# Patient Record
Sex: FEMALE | Race: WHITE | HISPANIC OR LATINO | Employment: FULL TIME | URBAN - METROPOLITAN AREA
[De-identification: names, ages, dates, MRNs, and addresses within clinical notes are randomized per-mention and may not be internally consistent; named-entity substitution may affect disease eponyms.]

---

## 2020-12-28 ENCOUNTER — NON-PROVIDER VISIT (OUTPATIENT)
Dept: OCCUPATIONAL MEDICINE | Facility: CLINIC | Age: 25
End: 2020-12-28

## 2020-12-28 ENCOUNTER — EH NON-PROVIDER (OUTPATIENT)
Dept: OCCUPATIONAL MEDICINE | Facility: CLINIC | Age: 25
End: 2020-12-28

## 2020-12-28 DIAGNOSIS — Z02.89 ENCOUNTER FOR OCCUPATIONAL HEALTH EXAMINATION INVOLVING RESPIRATOR: ICD-10-CM

## 2020-12-28 DIAGNOSIS — Z23 NEED FOR VACCINATION: ICD-10-CM

## 2020-12-28 PROCEDURE — 94375 RESPIRATORY FLOW VOLUME LOOP: CPT | Performed by: PREVENTIVE MEDICINE

## 2021-05-09 ENCOUNTER — OFFICE VISIT (OUTPATIENT)
Dept: URGENT CARE | Facility: CLINIC | Age: 26
End: 2021-05-09
Payer: COMMERCIAL

## 2021-05-09 ENCOUNTER — APPOINTMENT (OUTPATIENT)
Dept: RADIOLOGY | Facility: IMAGING CENTER | Age: 26
End: 2021-05-09
Attending: FAMILY MEDICINE
Payer: COMMERCIAL

## 2021-05-09 VITALS
RESPIRATION RATE: 16 BRPM | HEART RATE: 78 BPM | TEMPERATURE: 97.9 F | SYSTOLIC BLOOD PRESSURE: 104 MMHG | OXYGEN SATURATION: 98 % | DIASTOLIC BLOOD PRESSURE: 58 MMHG

## 2021-05-09 DIAGNOSIS — S63.636A SPRAIN OF INTERPHALANGEAL JOINT OF RIGHT LITTLE FINGER, INITIAL ENCOUNTER: ICD-10-CM

## 2021-05-09 PROCEDURE — 99203 OFFICE O/P NEW LOW 30 MIN: CPT | Performed by: FAMILY MEDICINE

## 2021-05-09 PROCEDURE — 73140 X-RAY EXAM OF FINGER(S): CPT | Mod: TC,RT | Performed by: FAMILY MEDICINE

## 2021-05-09 RX ORDER — NORGESTIMATE AND ETHINYL ESTRADIOL 7DAYSX3 28
KIT ORAL
COMMUNITY
Start: 2021-04-19

## 2021-05-09 RX ORDER — DUPILUMAB 300 MG/2ML
INJECTION, SOLUTION SUBCUTANEOUS
COMMUNITY
Start: 2021-04-24 | End: 2024-03-01

## 2021-05-09 NOTE — PROGRESS NOTES
Subjective:      Saroj King is a 25 y.o. female who presents with Finger Injury ([id number], right pinky finger)    - This is a pleasant and nontoxic appearing 25 y.o. female with c/o playing rugby yesterday and hurt Rt pinky finger. Thinks maybe it got twisted in a jersey       ALLERGIES:  Patient has no known allergies.     PMH:  History reviewed. No pertinent past medical history.     PSH:  History reviewed. No pertinent surgical history.    MEDS:    Current Outpatient Medications:   •  DUPIXENT 300 MG/2ML Solution Prefilled Syringe injection, , Disp: , Rfl:   •  TRELEGY ELLIPTA 100-62.5-25 MCG/INH AEROSOL POWDER, BREATH ACTIVATED, INHALE 1 PUFF BY MOUTH AT THE SAME TIME EVERY DAY, Disp: , Rfl:   •  TRI-SPRINTEC 0.18/0.215/0.25 MG-35 MCG Tab, TAKE 1 TABLET BY MOUTH ONE TIME DAILY. NO FURTHER REFILLS UNTIL APPOINTMENT IS MADE, Disp: , Rfl:   •  Cetirizine HCl (ZYRTEC PO), Take  by mouth., Disp: , Rfl:   •  ALBUTEROL SULFATE PO, Take  by mouth., Disp: , Rfl:   •  Fluticasone-Salmeterol (ADVAIR HFA INH), Inhale  by mouth., Disp: , Rfl:   •  Etonogestrel (NEXPLANON SC), Inject  as instructed., Disp: , Rfl:     ** I have documented what I find to be significant in regards to past medical, social, family and surgical history  in my HPI or under PMH/PSH/FH review section, otherwise it is noncontributory **             HPI    Review of Systems   Musculoskeletal: Positive for joint pain.   All other systems reviewed and are negative.         Objective:     /58 (BP Location: Left arm, Patient Position: Sitting, BP Cuff Size: Adult)   Pulse 78   Temp 36.6 °C (97.9 °F) (Temporal)   Resp 16   SpO2 98%      Physical Exam  Vitals and nursing note reviewed.   Constitutional:       General: She is not in acute distress.     Appearance: Normal appearance. She is well-developed.   HENT:      Head: Normocephalic and atraumatic.   Eyes:      General: No scleral icterus.  Cardiovascular:      Heart sounds: Normal heart  sounds. No murmur.   Pulmonary:      Effort: Pulmonary effort is normal. No respiratory distress.   Musculoskeletal:        Hands:       Comments: Rt pinky: + edema, bruising and TTP. Good SROM   Skin:     Coloration: Skin is not jaundiced or pale.   Neurological:      Mental Status: She is alert.      Motor: No abnormal muscle tone.   Psychiatric:         Mood and Affect: Mood normal.         Behavior: Behavior normal.                 Assessment/Plan:          1. Sprain of interphalangeal joint of right little finger, initial encounter  DX-FINGER(S) 2+ RIGHT       Xray: no acute findings by MY READ. RADIOLOGY READ PENDING.     - Dx, plan & d/c instructions discussed w/ patient   - RICE, stay hydrated OTC Motrin 3 tabs every 8hrs x 5 days  - long finger splint  - E.R. precautions discussed     Follow up with their PCP in 7 days, ER if not improving or feeling/getting worse.    Any realistic side effects of medications that may have been given today reviewed.     Patient left in stable condition     Please note that this dictation was created using voice recognition software. I have made every reasonable attempt to correct obvious errors, but I expect that there are errors of grammar and possibly content that I did not discover before finalizing the note.

## 2021-05-13 ENCOUNTER — OFFICE VISIT (OUTPATIENT)
Dept: MEDICAL GROUP | Facility: MEDICAL CENTER | Age: 26
End: 2021-05-13
Payer: COMMERCIAL

## 2021-05-13 VITALS
BODY MASS INDEX: 32.02 KG/M2 | HEART RATE: 100 BPM | WEIGHT: 204 LBS | TEMPERATURE: 97.5 F | SYSTOLIC BLOOD PRESSURE: 108 MMHG | DIASTOLIC BLOOD PRESSURE: 60 MMHG | OXYGEN SATURATION: 96 % | HEIGHT: 67 IN

## 2021-05-13 DIAGNOSIS — Z76.89 ENCOUNTER TO ESTABLISH CARE: ICD-10-CM

## 2021-05-13 DIAGNOSIS — R45.86 MOOD CHANGES: ICD-10-CM

## 2021-05-13 DIAGNOSIS — J45.40 MODERATE PERSISTENT ASTHMA WITHOUT COMPLICATION: ICD-10-CM

## 2021-05-13 DIAGNOSIS — B07.9 VERRUCA VULGARIS: ICD-10-CM

## 2021-05-13 DIAGNOSIS — I83.813 VARICOSE VEINS OF BOTH LOWER EXTREMITIES WITH PAIN: ICD-10-CM

## 2021-05-13 PROCEDURE — 99203 OFFICE O/P NEW LOW 30 MIN: CPT | Performed by: NURSE PRACTITIONER

## 2021-05-13 RX ORDER — FLUOXETINE 10 MG/1
10 CAPSULE ORAL DAILY
Qty: 30 CAPSULE | Refills: 1 | Status: SHIPPED | OUTPATIENT
Start: 2021-05-13 | End: 2021-07-10

## 2021-05-13 NOTE — PROGRESS NOTES
"Chief Complaint   Patient presents with   • Medication Refill        Saroj King is a 25 y.o. 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     Asthma  Currently followed by an allergy specialist for this.  Currently taking Trelegy.  She was also placed on Dupixent for eczema however has been however asthma.  Has been getting allergy injections since January 2020.  She has an upcoming appointment in June for repeat spirometry.  She feels as if her asthma has been in better control since leaving her previous environments.  Severe allergy to cats.    Wart  Patient has a wart on her thumb that has been ongoing for quite some time.  Has tried multiple remedies and has not successfully been resolved.  Would like cryotherapy at some time.    Varicose vein  Patient also has varicose veins.  States she has had a since he was 13 years old.  Has been wearing compression stockings on and off for this.  They are quite prominent especially in her upper thighs as well as behind her knee.    Mood changes  At this time patient has brought this up at the end of the visit and our discussion this was not detailed.  Have expressed my concerns for immediately prescribing medication that could potentially throw her into a manic episode without having an accurate diagnosis.  Patient mentions that she has been following with counseling once a week.  States that she has been having \"highs and lows\" of her mood.  She is called out a few times as she is unable to go to work due to her mood changes.  She is currently not on any medication however feeling somewhat more depressive.   Mentions that they are \"working with a diagnosis of bipolar.\"  She would like to start medications at this time.        ROS:  Denies any Headache, Blurred Vision, Confusion, Chest pain,  Shortness of breath,  Abdominal pain, Changes of bowel or bladder, " Hematuria, Hematochezia, Lower ext. edema, Fevers, Nights sweats, Weight Changes, Focal weakness or numbness.  And all other systems reviewed and all are negative. Positive for : See above    Current Outpatient Medications:   •  FLUoxetine (PROZAC) 10 MG Cap, Take 1 capsule by mouth every day., Disp: 30 capsule, Rfl: 1  •  DUPIXENT 300 MG/2ML Solution Prefilled Syringe injection, , Disp: , Rfl:   •  TRELEGY ELLIPTA 100-62.5-25 MCG/INH AEROSOL POWDER, BREATH ACTIVATED, INHALE 1 PUFF BY MOUTH AT THE SAME TIME EVERY DAY, Disp: , Rfl:   •  TRI-SPRINTEC 0.18/0.215/0.25 MG-35 MCG Tab, TAKE 1 TABLET BY MOUTH ONE TIME DAILY. NO FURTHER REFILLS UNTIL APPOINTMENT IS MADE, Disp: , Rfl:   •  Cetirizine HCl (ZYRTEC PO), Take  by mouth., Disp: , Rfl:     No Known Allergies    Past Medical History:   Diagnosis Date   • Asthma      Past Surgical History:   Procedure Laterality Date   • TONSILLECTOMY AND ADENOIDECTOMY       Family History   Problem Relation Age of Onset   • Stroke Maternal Grandmother         hemorrhagic   • Hypertension Maternal Grandmother    • Lung Cancer Maternal Grandfather    • Cancer Maternal Grandfather    • Diabetes Neg Hx      Social History     Socioeconomic History   • Marital status: Unknown     Spouse name: Not on file   • Number of children: Not on file   • Years of education: Not on file   • Highest education level: Not on file   Occupational History   • Not on file   Tobacco Use   • Smoking status: Never Smoker   • Smokeless tobacco: Never Used   Vaping Use   • Vaping Use: Never used   Substance and Sexual Activity   • Alcohol use: Yes     Comment: Occ   • Drug use: Never   • Sexual activity: Not on file   Other Topics Concern   • Not on file   Social History Narrative   • Not on file     Social Determinants of Health     Financial Resource Strain:    • Difficulty of Paying Living Expenses:    Food Insecurity:    • Worried About Running Out of Food in the Last Year:    • Ran Out of Food in the Last  "Year:    Transportation Needs:    • Lack of Transportation (Medical):    • Lack of Transportation (Non-Medical):    Physical Activity:    • Days of Exercise per Week:    • Minutes of Exercise per Session:    Stress:    • Feeling of Stress :    Social Connections:    • Frequency of Communication with Friends and Family:    • Frequency of Social Gatherings with Friends and Family:    • Attends [employer] Services:    • Active Member of Clubs or Organizations:    • Attends Club or Organization Meetings:    • Marital Status:    Intimate Partner Violence:    • Fear of Current or Ex-Partner:    • Emotionally Abused:    • Physically Abused:    • Sexually Abused:        Objective:     Vitals: /60 (BP Location: Right arm, Patient Position: Sitting, BP Cuff Size: Adult)   Pulse 100   Temp 36.4 °C (97.5 °F) (Temporal)   Ht 1.689 m (5' 6.5\")   Wt 92.5 kg (204 lb)   LMP 2021   SpO2 96%   Breastfeeding No   BMI 32.43 kg/m²      General: Alert, pleasant, NAD  HEENT:  Normocephalic.  Neck supple.  No thyromegaly or masses palpated. No cervical or supraclavicular lymphadenopathy.  Heart:  Regular rate and rhythm.  S1 and S2 normal.  No murmurs appreciated.    Respiratory:  Normal respiratory effort.  Clear to auscultation bilaterally.    Skin:  Warm, dry, no rashes large tortuous varicosities present.  Musculoskeletal:  Gait is normal.  Moves all extremities well.  Extremities:   No leg edema.  Neurological: No tremors  Psych:  Affect/mood is normal, judgement is good, memory is intact, grooming is appropriate.      Assessment and Plan.     25 y.o. female to establish care and discuss the followin. Varicose veins of both lower extremities with pain  Recommend follow-up with vascular surgery for this.  Recommend compression stockings, weight loss, exercise.  - REFERRAL TO VASCULAR SURGERY    2. Verruca vulgaris  Follow-up for cryotherapy as needed.    3. Moderate persistent asthma without complication  This " is a known problem to the patient.  Continue to follow-up with allergy specialist regarding this.    4. Mood changes  New problem to me.  At this time would recommend continued counseling and patient to follow-up for further discussion regarding this as it was quite rushed during our visit.  At this time feel comfortable prescribing a low dose of fluoxetine as this has been approved for patients with depression as well as bipolar.  Have discussed side effects.  Follow-up 1 month  - FLUoxetine (PROZAC) 10 MG Cap; Take 1 capsule by mouth every day.  Dispense: 30 capsule; Refill: 1    5. Encounter to establish care        Return in about 4 weeks (around 6/10/2021).          Mireya ZAIDI.

## 2021-05-17 PROBLEM — B07.9 VERRUCA VULGARIS: Status: ACTIVE | Noted: 2021-05-17

## 2021-05-19 PROBLEM — R45.86 MOOD CHANGES: Status: ACTIVE | Noted: 2021-05-19

## 2021-05-19 PROBLEM — Z87.42 HISTORY OF ABNORMAL CERVICAL PAP SMEAR: Status: ACTIVE | Noted: 2021-05-19

## 2021-05-19 PROBLEM — L23.9 ALLERGIC ECZEMA: Status: ACTIVE | Noted: 2021-05-19

## 2021-05-19 PROBLEM — J45.40 MODERATE PERSISTENT ASTHMA WITHOUT COMPLICATION: Status: ACTIVE | Noted: 2021-05-19

## 2021-07-09 DIAGNOSIS — R45.86 MOOD CHANGES: ICD-10-CM

## 2021-07-10 RX ORDER — FLUOXETINE 10 MG/1
10 CAPSULE ORAL DAILY
Qty: 30 CAPSULE | Refills: 1 | Status: SHIPPED | OUTPATIENT
Start: 2021-07-10

## 2024-03-01 PROBLEM — S82.892A ANKLE FRACTURE, LEFT: Status: ACTIVE | Noted: 2024-03-01
